# Patient Record
Sex: FEMALE | Race: OTHER | HISPANIC OR LATINO | URBAN - METROPOLITAN AREA
[De-identification: names, ages, dates, MRNs, and addresses within clinical notes are randomized per-mention and may not be internally consistent; named-entity substitution may affect disease eponyms.]

---

## 2022-04-18 ENCOUNTER — EMERGENCY (EMERGENCY)
Facility: HOSPITAL | Age: 42
LOS: 1 days | Discharge: HOME | End: 2022-04-18
Attending: EMERGENCY MEDICINE
Payer: COMMERCIAL

## 2022-04-18 VITALS
TEMPERATURE: 98 F | RESPIRATION RATE: 18 BRPM | OXYGEN SATURATION: 99 % | HEART RATE: 99 BPM | DIASTOLIC BLOOD PRESSURE: 74 MMHG | SYSTOLIC BLOOD PRESSURE: 109 MMHG

## 2022-04-18 VITALS
HEART RATE: 141 BPM | SYSTOLIC BLOOD PRESSURE: 130 MMHG | RESPIRATION RATE: 24 BRPM | WEIGHT: 240.08 LBS | DIASTOLIC BLOOD PRESSURE: 74 MMHG | TEMPERATURE: 98 F | OXYGEN SATURATION: 98 %

## 2022-04-18 LAB
ANION GAP SERPL CALC-SCNC: 16 MMOL/L — SIGNIFICANT CHANGE UP (ref 5–17)
APTT BLD: 26.6 SEC — LOW (ref 27.5–35.5)
BASOPHILS # BLD AUTO: 0.06 K/UL — SIGNIFICANT CHANGE UP (ref 0–0.2)
BASOPHILS NFR BLD AUTO: 0.7 % — SIGNIFICANT CHANGE UP (ref 0–2)
BLD GP AB SCN SERPL QL: SIGNIFICANT CHANGE UP
BUN SERPL-MCNC: 8.7 MG/DL — SIGNIFICANT CHANGE UP (ref 8–20)
CALCIUM SERPL-MCNC: 8.8 MG/DL — SIGNIFICANT CHANGE UP (ref 8.6–10.2)
CHLORIDE SERPL-SCNC: 105 MMOL/L — SIGNIFICANT CHANGE UP (ref 98–107)
CO2 SERPL-SCNC: 20 MMOL/L — LOW (ref 22–29)
CREAT SERPL-MCNC: 0.57 MG/DL — SIGNIFICANT CHANGE UP (ref 0.5–1.3)
EGFR: 116 ML/MIN/1.73M2 — SIGNIFICANT CHANGE UP
EOSINOPHIL # BLD AUTO: 0.21 K/UL — SIGNIFICANT CHANGE UP (ref 0–0.5)
EOSINOPHIL NFR BLD AUTO: 2.4 % — SIGNIFICANT CHANGE UP (ref 0–6)
GLUCOSE SERPL-MCNC: 132 MG/DL — HIGH (ref 70–99)
HCG SERPL-ACNC: <4 MIU/ML — SIGNIFICANT CHANGE UP
HCT VFR BLD CALC: 38.9 % — SIGNIFICANT CHANGE UP (ref 34.5–45)
HGB BLD-MCNC: 12.6 G/DL — SIGNIFICANT CHANGE UP (ref 11.5–15.5)
IMM GRANULOCYTES NFR BLD AUTO: 0.4 % — SIGNIFICANT CHANGE UP (ref 0–1.5)
INR BLD: 1.01 RATIO — SIGNIFICANT CHANGE UP (ref 0.88–1.16)
LYMPHOCYTES # BLD AUTO: 1.5 K/UL — SIGNIFICANT CHANGE UP (ref 1–3.3)
LYMPHOCYTES # BLD AUTO: 16.8 % — SIGNIFICANT CHANGE UP (ref 13–44)
MCHC RBC-ENTMCNC: 29.3 PG — SIGNIFICANT CHANGE UP (ref 27–34)
MCHC RBC-ENTMCNC: 32.4 GM/DL — SIGNIFICANT CHANGE UP (ref 32–36)
MCV RBC AUTO: 90.5 FL — SIGNIFICANT CHANGE UP (ref 80–100)
MONOCYTES # BLD AUTO: 0.39 K/UL — SIGNIFICANT CHANGE UP (ref 0–0.9)
MONOCYTES NFR BLD AUTO: 4.4 % — SIGNIFICANT CHANGE UP (ref 2–14)
NEUTROPHILS # BLD AUTO: 6.71 K/UL — SIGNIFICANT CHANGE UP (ref 1.8–7.4)
NEUTROPHILS NFR BLD AUTO: 75.3 % — SIGNIFICANT CHANGE UP (ref 43–77)
PLATELET # BLD AUTO: 278 K/UL — SIGNIFICANT CHANGE UP (ref 150–400)
POTASSIUM SERPL-MCNC: 3.9 MMOL/L — SIGNIFICANT CHANGE UP (ref 3.5–5.3)
POTASSIUM SERPL-SCNC: 3.9 MMOL/L — SIGNIFICANT CHANGE UP (ref 3.5–5.3)
PROTHROM AB SERPL-ACNC: 11.7 SEC — SIGNIFICANT CHANGE UP (ref 10.5–13.4)
RBC # BLD: 4.3 M/UL — SIGNIFICANT CHANGE UP (ref 3.8–5.2)
RBC # FLD: 13.8 % — SIGNIFICANT CHANGE UP (ref 10.3–14.5)
SARS-COV-2 RNA SPEC QL NAA+PROBE: SIGNIFICANT CHANGE UP
SODIUM SERPL-SCNC: 141 MMOL/L — SIGNIFICANT CHANGE UP (ref 135–145)
TROPONIN T SERPL-MCNC: <0.01 NG/ML — SIGNIFICANT CHANGE UP (ref 0–0.06)
WBC # BLD: 8.91 K/UL — SIGNIFICANT CHANGE UP (ref 3.8–10.5)
WBC # FLD AUTO: 8.91 K/UL — SIGNIFICANT CHANGE UP (ref 3.8–10.5)

## 2022-04-18 PROCEDURE — 71045 X-RAY EXAM CHEST 1 VIEW: CPT | Mod: 26

## 2022-04-18 PROCEDURE — 85730 THROMBOPLASTIN TIME PARTIAL: CPT

## 2022-04-18 PROCEDURE — 84484 ASSAY OF TROPONIN QUANT: CPT

## 2022-04-18 PROCEDURE — 86850 RBC ANTIBODY SCREEN: CPT

## 2022-04-18 PROCEDURE — 73130 X-RAY EXAM OF HAND: CPT

## 2022-04-18 PROCEDURE — 71260 CT THORAX DX C+: CPT | Mod: MA

## 2022-04-18 PROCEDURE — 73110 X-RAY EXAM OF WRIST: CPT | Mod: 26,LT

## 2022-04-18 PROCEDURE — 93005 ELECTROCARDIOGRAM TRACING: CPT

## 2022-04-18 PROCEDURE — 86901 BLOOD TYPING SEROLOGIC RH(D): CPT

## 2022-04-18 PROCEDURE — 36415 COLL VENOUS BLD VENIPUNCTURE: CPT

## 2022-04-18 PROCEDURE — 99053 MED SERV 10PM-8AM 24 HR FAC: CPT

## 2022-04-18 PROCEDURE — 71045 X-RAY EXAM CHEST 1 VIEW: CPT

## 2022-04-18 PROCEDURE — 84702 CHORIONIC GONADOTROPIN TEST: CPT

## 2022-04-18 PROCEDURE — 99284 EMERGENCY DEPT VISIT MOD MDM: CPT

## 2022-04-18 PROCEDURE — 93010 ELECTROCARDIOGRAM REPORT: CPT

## 2022-04-18 PROCEDURE — 73110 X-RAY EXAM OF WRIST: CPT

## 2022-04-18 PROCEDURE — 73130 X-RAY EXAM OF HAND: CPT | Mod: 26,LT

## 2022-04-18 PROCEDURE — U0005: CPT

## 2022-04-18 PROCEDURE — 85025 COMPLETE CBC W/AUTO DIFF WBC: CPT

## 2022-04-18 PROCEDURE — 86900 BLOOD TYPING SEROLOGIC ABO: CPT

## 2022-04-18 PROCEDURE — 71260 CT THORAX DX C+: CPT | Mod: 26,MA

## 2022-04-18 PROCEDURE — 96375 TX/PRO/DX INJ NEW DRUG ADDON: CPT | Mod: XU

## 2022-04-18 PROCEDURE — 85610 PROTHROMBIN TIME: CPT

## 2022-04-18 PROCEDURE — 99285 EMERGENCY DEPT VISIT HI MDM: CPT | Mod: 25

## 2022-04-18 PROCEDURE — 96374 THER/PROPH/DIAG INJ IV PUSH: CPT | Mod: XU

## 2022-04-18 PROCEDURE — 80048 BASIC METABOLIC PNL TOTAL CA: CPT

## 2022-04-18 PROCEDURE — U0003: CPT

## 2022-04-18 RX ORDER — MORPHINE SULFATE 50 MG/1
4 CAPSULE, EXTENDED RELEASE ORAL ONCE
Refills: 0 | Status: DISCONTINUED | OUTPATIENT
Start: 2022-04-18 | End: 2022-04-18

## 2022-04-18 RX ORDER — ACETAMINOPHEN 500 MG
1000 TABLET ORAL ONCE
Refills: 0 | Status: COMPLETED | OUTPATIENT
Start: 2022-04-18 | End: 2022-04-18

## 2022-04-18 RX ADMIN — Medication 400 MILLIGRAM(S): at 08:53

## 2022-04-18 RX ADMIN — MORPHINE SULFATE 4 MILLIGRAM(S): 50 CAPSULE, EXTENDED RELEASE ORAL at 05:08

## 2022-04-18 RX ADMIN — MORPHINE SULFATE 4 MILLIGRAM(S): 50 CAPSULE, EXTENDED RELEASE ORAL at 04:51

## 2022-04-18 NOTE — ED ADULT NURSE NOTE - CHPI ED NUR SYMPTOMS NEG
no decreased eating/drinking/no disorientation/no dizziness/no fussiness/no headache/no laceration/no loss of consciousness/no neck tenderness/no sleeping issues
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
## 2022-04-18 NOTE — ED PROVIDER NOTE - NSFOLLOWUPCLINICS_GEN_ALL_ED_FT
[facility] Acute Care Surgery  Acute Care Surgery  [address] [place] [id number]  Phone: [phone]  Fax:

## 2022-04-18 NOTE — ED PROVIDER NOTE - PATIENT PORTAL LINK FT
You can access the FollowMyHealth Patient Portal offered by [facility] by registering at the following website: [email or web address]. By joining [facility]’s FollowMyHealth portal, you will also be able to view your health information using other applications (apps) compatible with our system.
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
## 2022-04-18 NOTE — ED PROVIDER NOTE - PROGRESS NOTE DETAILS
CARLIN Stacy: Trauma consulted. Oz: Patient signed out to me by previous team. Patient evaluated bedside and is c/o 8/10 substernal chest pain. Vitals stable. Plan for analgesia.
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
## 2022-04-18 NOTE — ED PROVIDER NOTE - NSFOLLOWUPINSTRUCTIONS_ED_ALL_ED_FT
- Ibuprofen 600mg every 6 hours as needed for pain.  - Acetaminophen 650mg every 6 hours as needed for pain.   - Please bring all documentation from your ED visit to any related future follow up appointment.  - Please call to schedule follow up appointment with your primary care physician within 24-48 hours.  - Please seek immediate medical attention for any new/worsening, signs/symptoms, or concerns.    Feel better!    - Ibuprofeno 600mg cada 6 horas según necesidad para el dolor.  - Acetaminofén 650 mg cada 6 horas según sea necesario para el dolor.  - Traiga toda la documentación de valles visita al servicio de urgencias a cualquier cherrie de seguimiento futura relacionada.  - Llame para programar minesh cherrie de seguimiento con valles médico de atención primaria dentro de las 24 a 48 horas.  - Busque atención médica inmediata ante cualquier nuevo / empeoramiento, signos / síntomas o inquietudes.    ¡Sentirse mejor!       Accidente automovilístico    LO QUE NECESITA SABER:    Los accidentes automovilísticos pueden causar lesiones ocasionadas por el impacto o por juan sido movido de un lado al otro dentro del cherie. Podría tener un hematoma en el abdomen, pecho o andrea debido al cinturón de seguridad. También puede que tenga dolor en valles david, andrea o espalda. Podría sentir dolor en las rodillas, caderas o muslos si valles cuerpo golpea el tablero o el volante. El dolor muscular tiende a empeorar de 1 a 2 días después del accidente.    INSTRUCCIONES SOBRE EL [facility] M[facility]EL HOSPITALARIA:    Llame al número de emergencias local (911 en los [place]) si:  •Usted tiene un nuevo dolor de pecho o éste empeora, o tiene falta de aliento.          Llame a valles médico si:  •Usted tiene un dolor nuevo o peor en el abdomen.      •Usted tiene náuseas y vómitos que no mejoran.      •Usted tiene un jani dolor de ulisses.      •Usted tiene debilidad, hormigueo o adormecimiento en eliseo brazos o piernas.      •Usted tiene un dolor nuevo o peor que le dificulta el movimiento.      •Usted tiene dolor que aparece de 2 a 3 días después del accidente.      •Usted tiene preguntas o inquietudes acerca de valles condición o cuidado.      Medicamentos:  •Analgésicos:Usted podría recibir medicamento para quitarle o reducir el dolor. No espere a que el dolor sea muy intenso para kendy el medicamento.      •Los ROSCOE,adali el ibuprofeno, ayudan a disminuir la inflamación, el dolor y la fiebre. Julienne medicamento está disponible con o sin minesh receta médica. Los ROSCOE pueden causar sangrado estomacal o problemas renales en ciertas personas. Si usted esta tomando un anticoágulante,  siempre pregunte si los AINEs son seguros para usted. Siempre nathaly la etiqueta de julienne medicamento y siga las instrucciones. No administre julienne medicamento a niños menores de 6 meses de lizzie sin antes obtener la autorización de valles médico.      •[place] eliseo medicamentos adali se le haya indicado.Consulte con valles médico si usted kaley que valles medicamento no le está ayudando o si presenta efectos secundarios. Infórmele si es alérgico a algún medicamento. Mantenga minesh lista actualizada de los medicamentos, las vitaminas y los productos herbales que larissa. Incluya los siguientes datos de los medicamentos: cantidad, frecuencia y motivo de administración. Traiga con usted la lista o los envases de las píldoras a eliseo citas de seguimiento. Lleve la lista de los medicamentos con usted en ruddy de minesh emergencia.      Cuidados personales:  •Use hielo y calor.El hielo ayuda a disminuir la inflamación y el dolor. El hielo también puede contribuir a evitar el daño de los tejidos. Use minesh compresa de hielo o ponga hielo triturado en minesh bolsa de plástico. Cúbrala con minesh toalla y aplíquela al área adolorida por 15 a 20 minutos cada hora o adali se le indique. Después de 2 días, use minesh compresa caliente en el área lesionada. Aplique calor adali se lo recomiende el médico.      •Estire eliseo músculos cuidadosamente.Dread ejercicios suaves para estirar eliseo músculos después de juan sufrido un accidente automovilístico. Consulte con valles médico sobre cuáles ejercicios hacer.      Consejos de seguridad:Lo siguiente puede ayudar a prevenir otro accidente automovilístico o a reducir el riesgo de lesiones:   •Use siempre valles cinturón de seguridad.El uso de valles cinturón de seguridad ayudará a reducir las lesiones sufridas por accidentes automovilísticos. El cinturón de seguridad debe tener minesh m[place] que atraviese valles pecho y otra que atraviese valles regazo.      •Siempre coloque a valles hijo en un asiento de seguridad para niños.Use un asiento de seguridad hecho para valles edad, altura y peso. Elija un asiento de seguridad que tenga un arnés y un broche. Coloque el asiento de seguridad en la plaza del medio del asiento trasero del automóvil. El asiento de seguridad no debería moverse en ninguna dirección más de 1 pulgada después de ajustarlo. Siga siempre las instrucciones proporcionadas para valles asiento de seguridad para ayudarle a colocarlo. Las instrucciones también le indicarán cómo sujetar a valles marcy en el asiento correctamente. Pregúntele a valles médico sobre más información acerca de los asientos de seguridad para niños.   Asiento de seguridad para niños en automóviles           •Disminuya la velocidad.Maneje valles cherie al límite de velocidad para reducir valles riesgo de accidentes automovilísticos.      •No maneje si se siente cansado.Usted reacciona más lentamente cuando está cansado. El tiempo de reacción lento aumentará el riesgo de un accidente automovilístico.      •No hable por teléfono ni envíe mensajes de texto mientras maneje.Usted no reaccionará lo suficientemente rápido en minesh emergencia si se distrae con mensajes de texto o conversaciones.      •No consuma drogas ni alcohol antes de manejar.Es probable que se sienta más cansado o tome riesgos que usualmente no tomaría. No maneje después de kendy medicamentos que le dan sueño. Use un conductor designado o dread arreglos para que lo lleven a valles casa.      •Ayude a valles hijo adolescente a convertirse en un conductor seguro.Sea un buen modelo al manejar. Hable con valles hijo adolescente sobre las maneras de reducir el riesgo de un accidente automovilístico. Estas incluyen no conducir cuando está cansado y no tener distracciones, adali un teléfono. Recuérdele a valles hijo adolescente que siempre debe ir al límite de velocidad y usar el cinturón de seguridad.      Acuda a eliseo consultas de control con valles médico según le indicaron.Anote eliseo preguntas para que se acuerde de hacerlas brenda eliseo visitas. Seguimiento con el equipo de trauma adali en unas dos semanas. La información de contacto está arriba.
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
